# Patient Record
Sex: MALE | Race: WHITE | NOT HISPANIC OR LATINO | URBAN - METROPOLITAN AREA
[De-identification: names, ages, dates, MRNs, and addresses within clinical notes are randomized per-mention and may not be internally consistent; named-entity substitution may affect disease eponyms.]

---

## 2020-09-21 ENCOUNTER — OFFICE (OUTPATIENT)
Dept: URBAN - METROPOLITAN AREA CLINIC 11 | Facility: CLINIC | Age: 35
End: 2020-09-21

## 2020-09-21 VITALS
HEART RATE: 87 BPM | SYSTOLIC BLOOD PRESSURE: 136 MMHG | WEIGHT: 227 LBS | HEIGHT: 71 IN | OXYGEN SATURATION: 97 % | DIASTOLIC BLOOD PRESSURE: 79 MMHG

## 2020-09-21 DIAGNOSIS — M94.0 CHONDROCOSTAL JUNCTION SYNDROME [TIETZE]: ICD-10-CM

## 2020-09-21 DIAGNOSIS — R07.9 CHEST PAIN, UNSPECIFIED: ICD-10-CM

## 2020-09-21 DIAGNOSIS — K21.9 GASTRO-ESOPHAGEAL REFLUX DISEASE WITHOUT ESOPHAGITIS: ICD-10-CM

## 2020-09-21 DIAGNOSIS — K58.9 IRRITABLE BOWEL SYNDROME WITHOUT DIARRHEA: ICD-10-CM

## 2020-09-21 DIAGNOSIS — K62.5 HEMORRHAGE OF ANUS AND RECTUM: ICD-10-CM

## 2020-09-21 PROCEDURE — 99204 OFFICE O/P NEW MOD 45 MIN: CPT | Performed by: INTERNAL MEDICINE

## 2020-09-21 RX ORDER — METHYLPREDNISOLONE 4 MG/1
TABLET ORAL
Qty: 1 | Refills: 0 | Status: COMPLETED
Start: 2020-09-21 | End: 2020-12-21

## 2020-09-21 NOTE — SERVICEHPINOTES
He stated that he having issues with chest pains since about 2005.  He had stress testing then that was negaive.  He was to have seen GI but did not. The chest pain would come an dgo.  He stated htat it has not really changed over the year.  The pain is in the mid chest and sometimes he will have pain in the upper abdome  as well (it was in the RUQ when he had his abnomal HIDA).  He stated that it is intermittent and has varied.  He was last seen in 2012 and after having a pneumomediastinum after vomting and then a lap mumtaz ith an EF of 2% on HIDA.  Afterward he did improve for a few years and had also been on Prilosec.  In 2018, after stopping his sweet tea he was able to stop his Prilose for several months but did have to restart it due to heartburn.  He has had an increase in the chest pain over the past few years.  The pain has been more consistently and occurs throughout the day now with a heaviness.  This part may occur while at work. There is also a component of heartburn that also comes and goes.  It is worse with caffiene/sweet tea.   He has been on Prilosec BID (recently increased was on it once daily for about year) about a week ago.  He has noted that drinking Sprite will help him belch which helps the symptoms.  He reported intermittent gas issues as well as the reflux and chest pain. Stress does play a role in his GERD/chest pain and IBS. He has noted nestor edyspahgia with a feeling of a "chip sticking in my throat".  He has not had dysphagia to other food.  he has thought that the "sticking feeling" is more of a dry throat issue.  He has reported some intermittent red blood on the toilet paper if he has a large hard stool or diarrhea.  He has had the issue for over a year but it is not freuqent.  He was told that he might have hemorrhoids. He has had variable stools. He stated that he may have normal stools for a month.  Then he may have soft stools to diarrhea for about 3 days.  He has not noted a particular trigger.  He has tried avoiding dairy but was not certain if this was an issue or not.  He stated that he has been screening with celiac disease by labs last year. He tried a gluten free diet for about a week and a half last year. He has not had a family hx of colon cancer but was not certain if his parents had had colon polyps or not.  He had an abnormal stress test a few weeks ago and is scheduled to have a cath.

## 2020-09-21 NOTE — SERVICENOTES
He presents with a complex set of issues that overlap.  We discussed a dif dx of his chest pain inclduing reflux, atypical chest pain, cardiac issues, bile acid reflux, costochondritis, etc... He will need to complete his cath prior to doing the EGD (with bx of the esophagus to r/o eosinophilic esophagitis with the hx of pneumomediastium/bx of the small bowel with the diarrhea).  He will need to continue the BID PPIs and we discussed a GERD diet/life style change. Due to the exam findings, I would also start him on a medrol dose pack for costochondritis but he may need longer tx with an NSAID/CoxII.  As to his diarrhea and intermittent bleeding, this is likely IBS with hemorrhoids but at the time of the EGD, I would do an FSC to r/o other possible issues as well.  We discussed starting a daily fiber supplement.

## 2020-10-26 ENCOUNTER — AMBULATORY SURGICAL CENTER (OUTPATIENT)
Dept: URBAN - METROPOLITAN AREA SURGERY 3 | Facility: SURGERY | Age: 35
End: 2020-10-26

## 2020-10-26 ENCOUNTER — OFFICE (OUTPATIENT)
Dept: URBAN - METROPOLITAN AREA PATHOLOGY 22 | Facility: PATHOLOGY | Age: 35
End: 2020-10-26

## 2020-10-26 VITALS
DIASTOLIC BLOOD PRESSURE: 83 MMHG | HEART RATE: 76 BPM | RESPIRATION RATE: 14 BRPM | DIASTOLIC BLOOD PRESSURE: 47 MMHG | DIASTOLIC BLOOD PRESSURE: 47 MMHG | SYSTOLIC BLOOD PRESSURE: 128 MMHG | OXYGEN SATURATION: 99 % | RESPIRATION RATE: 14 BRPM | OXYGEN SATURATION: 100 % | RESPIRATION RATE: 20 BRPM | OXYGEN SATURATION: 98 % | OXYGEN SATURATION: 99 % | HEART RATE: 74 BPM | DIASTOLIC BLOOD PRESSURE: 83 MMHG | TEMPERATURE: 97.2 F | DIASTOLIC BLOOD PRESSURE: 89 MMHG | SYSTOLIC BLOOD PRESSURE: 125 MMHG | DIASTOLIC BLOOD PRESSURE: 74 MMHG | TEMPERATURE: 97.5 F | DIASTOLIC BLOOD PRESSURE: 77 MMHG | SYSTOLIC BLOOD PRESSURE: 102 MMHG | RESPIRATION RATE: 18 BRPM | HEART RATE: 68 BPM | WEIGHT: 230 LBS | SYSTOLIC BLOOD PRESSURE: 131 MMHG | RESPIRATION RATE: 15 BRPM | HEART RATE: 68 BPM | SYSTOLIC BLOOD PRESSURE: 102 MMHG | DIASTOLIC BLOOD PRESSURE: 77 MMHG | SYSTOLIC BLOOD PRESSURE: 125 MMHG | HEART RATE: 74 BPM | TEMPERATURE: 97.5 F | DIASTOLIC BLOOD PRESSURE: 74 MMHG | RESPIRATION RATE: 18 BRPM | SYSTOLIC BLOOD PRESSURE: 114 MMHG | HEART RATE: 76 BPM | TEMPERATURE: 97.2 F | SYSTOLIC BLOOD PRESSURE: 128 MMHG | OXYGEN SATURATION: 98 % | SYSTOLIC BLOOD PRESSURE: 114 MMHG | RESPIRATION RATE: 20 BRPM | RESPIRATION RATE: 16 BRPM | WEIGHT: 230 LBS | HEIGHT: 71 IN | HEIGHT: 71 IN | RESPIRATION RATE: 16 BRPM | DIASTOLIC BLOOD PRESSURE: 89 MMHG | SYSTOLIC BLOOD PRESSURE: 131 MMHG | RESPIRATION RATE: 15 BRPM | OXYGEN SATURATION: 100 %

## 2020-10-26 DIAGNOSIS — K62.5 HEMORRHAGE OF ANUS AND RECTUM: ICD-10-CM

## 2020-10-26 DIAGNOSIS — K29.50 UNSPECIFIED CHRONIC GASTRITIS WITHOUT BLEEDING: ICD-10-CM

## 2020-10-26 DIAGNOSIS — R19.4 CHANGE IN BOWEL HABIT: ICD-10-CM

## 2020-10-26 DIAGNOSIS — K21.9 GASTRO-ESOPHAGEAL REFLUX DISEASE WITHOUT ESOPHAGITIS: ICD-10-CM

## 2020-10-26 PROBLEM — K31.89 OTHER DISEASES OF STOMACH AND DUODENUM: Status: ACTIVE | Noted: 2020-10-26

## 2020-10-26 PROCEDURE — 88342 IMHCHEM/IMCYTCHM 1ST ANTB: CPT | Performed by: INTERNAL MEDICINE

## 2020-10-26 PROCEDURE — 45330 DIAGNOSTIC SIGMOIDOSCOPY: CPT | Mod: 51 | Performed by: INTERNAL MEDICINE

## 2020-10-26 PROCEDURE — 43239 EGD BIOPSY SINGLE/MULTIPLE: CPT | Performed by: INTERNAL MEDICINE

## 2020-10-26 PROCEDURE — 88313 SPECIAL STAINS GROUP 2: CPT | Performed by: INTERNAL MEDICINE

## 2020-10-26 PROCEDURE — 88305 TISSUE EXAM BY PATHOLOGIST: CPT | Performed by: INTERNAL MEDICINE

## 2020-12-21 ENCOUNTER — OFFICE (OUTPATIENT)
Dept: URBAN - METROPOLITAN AREA CLINIC 11 | Facility: CLINIC | Age: 35
End: 2020-12-21

## 2020-12-21 VITALS
SYSTOLIC BLOOD PRESSURE: 137 MMHG | OXYGEN SATURATION: 98 % | HEART RATE: 77 BPM | DIASTOLIC BLOOD PRESSURE: 71 MMHG | WEIGHT: 218 LBS | HEIGHT: 71 IN

## 2020-12-21 DIAGNOSIS — K21.9 GASTRO-ESOPHAGEAL REFLUX DISEASE WITHOUT ESOPHAGITIS: ICD-10-CM

## 2020-12-21 DIAGNOSIS — K58.0 IRRITABLE BOWEL SYNDROME WITH DIARRHEA: ICD-10-CM

## 2020-12-21 DIAGNOSIS — R07.89 OTHER CHEST PAIN: ICD-10-CM

## 2020-12-21 PROCEDURE — 99214 OFFICE O/P EST MOD 30 MIN: CPT | Performed by: INTERNAL MEDICINE

## 2020-12-21 RX ORDER — RIFAXIMIN 550 MG/1
TABLET ORAL
Qty: 42 | Refills: 0 | Status: COMPLETED
Start: 2020-12-21 | End: 2021-01-15

## 2020-12-21 NOTE — SERVICENOTES
His atypical chest pain and GERD are well controlled when he is consistent with his meds.  We discussed his reports of what sounds like IBS-D, use of dicyclomine (he is not able to work while on the IBS meds), and a trial of Xifaxan (including expectations).  If this is not successful, we discussed doing a lactulose breath test or even trial of cholestyramine.

## 2020-12-21 NOTE — SERVICEHPINOTES
"I"m better than I was Friday."  He stated that while had work he had issues with cramping and diarrhea. He had diarrhea twice Friday with watery stools. He was given Dicyclomine by his PCP.  He took it Friday and Saturday but could not work while taking it due to feeling sleepy.  On Sunday, he stated that he had an "upset stomach with belching".  He had three stols Sunday with two more normal stools and the a soft stool.  Later in the day his sx results.  He has not had fevers or chills.  He had some nuasea on Sat/Sun but not today.  He has not had vomiting. He had not reported it at his vist for his atypical chest pain but stated that it has it about 1-2 times a month but it varies. This has been issue for several years.  He has not noted a particular trigger for it. He has not found that lactose is an issue for him. He has thought that some of this was related to his gall bladder surgery in 2012.  He stated that his typical pattern is about 2-3 stools a day.  The recurrent issues have interferred with work.   He has a hx of atypical chest pain. He statedthat this has been doing much better.  This past week he has had two epsidoes of nocturnal reflux/chest pain that resolved with Tums. Other than this he has been doing quite well. He has some issues with compliance with his BID meds, which is what likely causes his sx this past week.

## 2021-01-19 ENCOUNTER — OFFICE (OUTPATIENT)
Dept: URBAN - METROPOLITAN AREA CLINIC 11 | Facility: CLINIC | Age: 36
End: 2021-01-19

## 2021-01-26 ENCOUNTER — OFFICE (OUTPATIENT)
Dept: URBAN - METROPOLITAN AREA CLINIC 22 | Facility: CLINIC | Age: 36
End: 2021-01-26

## 2021-01-26 DIAGNOSIS — R10.13 EPIGASTRIC PAIN: ICD-10-CM

## 2021-01-26 DIAGNOSIS — R07.89 OTHER CHEST PAIN: ICD-10-CM

## 2021-01-26 PROCEDURE — 74177 CT ABD & PELVIS W/CONTRAST: CPT | Performed by: INTERNAL MEDICINE

## 2021-01-28 ENCOUNTER — OFFICE (OUTPATIENT)
Dept: URBAN - METROPOLITAN AREA CLINIC 11 | Facility: CLINIC | Age: 36
End: 2021-01-28

## 2021-01-28 VITALS
HEART RATE: 82 BPM | WEIGHT: 217 LBS | HEIGHT: 71 IN | OXYGEN SATURATION: 97 % | DIASTOLIC BLOOD PRESSURE: 73 MMHG | SYSTOLIC BLOOD PRESSURE: 120 MMHG

## 2021-01-28 DIAGNOSIS — K58.0 IRRITABLE BOWEL SYNDROME WITH DIARRHEA: ICD-10-CM

## 2021-01-28 DIAGNOSIS — R19.7 DIARRHEA, UNSPECIFIED: ICD-10-CM

## 2021-01-28 PROCEDURE — 99213 OFFICE O/P EST LOW 20 MIN: CPT | Performed by: INTERNAL MEDICINE

## 2021-01-28 RX ORDER — CHOLESTYRAMINE 4 G/9G
4 POWDER, FOR SUSPENSION ORAL
Qty: 30 | Refills: 3 | Status: COMPLETED
Start: 2021-01-28 | End: 2021-03-23

## 2021-01-28 RX ORDER — DICYCLOMINE HYDROCHLORIDE 10 MG/1
CAPSULE ORAL
Refills: 0 | Status: COMPLETED
End: 2021-01-28

## 2021-01-28 NOTE — SERVICENOTES
We discussed his recent abdominal pain and recurrent diarrhea.  We reivewed his CT and with the notation go his eating prior to the CT, the CT is actually benign.  With with the fecal fats and IBS type sx (noting his prior eval), I would like to try him on cholestramine and possibly donnatal (which could be an issue with his work). It would appear that some if the issues is around taking Imodium for the diarrhea which causes constipation.  There may also be an issue with lactose.

## 2021-01-28 NOTE — SERVICEHPINOTES
"I had an episode at work a week ago Saturday."  He had an episode of "gut wrenching pain" followed by urgent stools.  The pain stopped after the diarrhea stool.  Then on Monday he had another episode initially with a feeling of lethargy.  It was followed by the pain and diarrhea.  He laid on the couch afterward.  He had a few smaller stools that afternoon.  He had a normal stool on Tuesday.  Wednesday, he got up to urinate and then went back to bed.  About 10 minutes later he had an other acute pain that was more intense than before.  He took Tums and Prilosec but it did not help his sx.  He did not have diarrhea after this spell.  He stated that he felt "cruddy" for a few days afterward.  He had normal stools this past Monday and Tuesday. He had gas and diarrhea after the CT this week. He had negative stools for infectious issues on 1/19.  He had positive fecal fats and negative inflammatory markers.He has not found a difference in his more chronic IBS sx with Xifaxan or dicyclomine. BRFONT style="BACKGROUND-COLOR: #ffffcc" visited="true"Prem has had normal bx by EGD/FSC. He had a Ct this week with some retained gastric contents but other other acute changes.  He had eaten just prior to CT (chicken tortilla soup and chicken nuggets). /FONT

## 2021-03-23 ENCOUNTER — OFFICE (OUTPATIENT)
Dept: URBAN - METROPOLITAN AREA CLINIC 11 | Facility: CLINIC | Age: 36
End: 2021-03-23

## 2021-03-23 VITALS
OXYGEN SATURATION: 98 % | DIASTOLIC BLOOD PRESSURE: 72 MMHG | HEIGHT: 71 IN | SYSTOLIC BLOOD PRESSURE: 117 MMHG | WEIGHT: 216 LBS | HEART RATE: 74 BPM

## 2021-03-23 DIAGNOSIS — K58.0 IRRITABLE BOWEL SYNDROME WITH DIARRHEA: ICD-10-CM

## 2021-03-23 PROCEDURE — 99213 OFFICE O/P EST LOW 20 MIN: CPT | Performed by: INTERNAL MEDICINE

## 2021-03-23 NOTE — SERVICENOTES
He has improved and I suspect that he has had bile acid diarrhea with IBS which is better after the course of the binders.  We discussed that he may have the IBS issues over time and a trial of FDguard.  He has used simethecone which helps.  We discussed limited caffiene and carbonated beverages, which may be an issue.   If he has recurrent diarrhea, we can go back to a course of the binders as well.